# Patient Record
Sex: MALE | Race: OTHER | Employment: OTHER | URBAN - METROPOLITAN AREA
[De-identification: names, ages, dates, MRNs, and addresses within clinical notes are randomized per-mention and may not be internally consistent; named-entity substitution may affect disease eponyms.]

---

## 2021-02-12 ENCOUNTER — APPOINTMENT (OUTPATIENT)
Dept: CT IMAGING | Facility: HOSPITAL | Age: 56
End: 2021-02-12
Attending: EMERGENCY MEDICINE
Payer: COMMERCIAL

## 2021-02-12 ENCOUNTER — APPOINTMENT (OUTPATIENT)
Dept: GENERAL RADIOLOGY | Facility: HOSPITAL | Age: 56
End: 2021-02-12
Attending: EMERGENCY MEDICINE
Payer: COMMERCIAL

## 2021-02-12 ENCOUNTER — HOSPITAL ENCOUNTER (EMERGENCY)
Facility: HOSPITAL | Age: 56
Discharge: HOME | End: 2021-02-12
Attending: EMERGENCY MEDICINE
Payer: COMMERCIAL

## 2021-02-12 VITALS
TEMPERATURE: 98 F | BODY MASS INDEX: 37.93 KG/M2 | OXYGEN SATURATION: 96 % | RESPIRATION RATE: 18 BRPM | HEIGHT: 72 IN | WEIGHT: 280 LBS | DIASTOLIC BLOOD PRESSURE: 90 MMHG | SYSTOLIC BLOOD PRESSURE: 113 MMHG | HEART RATE: 99 BPM

## 2021-02-12 DIAGNOSIS — S16.1XXA STRAIN OF NECK MUSCLE, INITIAL ENCOUNTER: ICD-10-CM

## 2021-02-12 DIAGNOSIS — S50.12XA CONTUSION OF LEFT FOREARM, INITIAL ENCOUNTER: ICD-10-CM

## 2021-02-12 DIAGNOSIS — S09.8XXA BLUNT HEAD TRAUMA, INITIAL ENCOUNTER: Primary | ICD-10-CM

## 2021-02-12 LAB
ANION GAP SERPL CALC-SCNC: 3 MMOL/L (ref 0–18)
BASOPHILS # BLD AUTO: 0.07 X10(3) UL (ref 0–0.2)
BASOPHILS NFR BLD AUTO: 0.7 %
BUN BLD-MCNC: 21 MG/DL (ref 7–18)
BUN/CREAT SERPL: 13.8 (ref 10–20)
CALCIUM BLD-MCNC: 10 MG/DL (ref 8.5–10.1)
CHLORIDE SERPL-SCNC: 102 MMOL/L (ref 98–112)
CO2 SERPL-SCNC: 33 MMOL/L (ref 21–32)
CREAT BLD-MCNC: 1.52 MG/DL
DEPRECATED RDW RBC AUTO: 44.1 FL (ref 35.1–46.3)
EOSINOPHIL # BLD AUTO: 0.12 X10(3) UL (ref 0–0.7)
EOSINOPHIL NFR BLD AUTO: 1.2 %
ERYTHROCYTE [DISTWIDTH] IN BLOOD BY AUTOMATED COUNT: 13.1 % (ref 11–15)
GLUCOSE BLD-MCNC: 80 MG/DL (ref 70–99)
HCT VFR BLD AUTO: 48.9 %
HGB BLD-MCNC: 16.9 G/DL
IMM GRANULOCYTES # BLD AUTO: 0.02 X10(3) UL (ref 0–1)
IMM GRANULOCYTES NFR BLD: 0.2 %
LYMPHOCYTES # BLD AUTO: 2.09 X10(3) UL (ref 1–4)
LYMPHOCYTES NFR BLD AUTO: 21.2 %
MCH RBC QN AUTO: 31.9 PG (ref 26–34)
MCHC RBC AUTO-ENTMCNC: 34.6 G/DL (ref 31–37)
MCV RBC AUTO: 92.4 FL
MONOCYTES # BLD AUTO: 0.88 X10(3) UL (ref 0.1–1)
MONOCYTES NFR BLD AUTO: 8.9 %
NEUTROPHILS # BLD AUTO: 6.69 X10 (3) UL (ref 1.5–7.7)
NEUTROPHILS # BLD AUTO: 6.69 X10(3) UL (ref 1.5–7.7)
NEUTROPHILS NFR BLD AUTO: 67.8 %
OSMOLALITY SERPL CALC.SUM OF ELEC: 288 MOSM/KG (ref 275–295)
PLATELET # BLD AUTO: 294 10(3)UL (ref 150–450)
POTASSIUM SERPL-SCNC: 3.7 MMOL/L (ref 3.5–5.1)
RBC # BLD AUTO: 5.29 X10(6)UL
SODIUM SERPL-SCNC: 138 MMOL/L (ref 136–145)
WBC # BLD AUTO: 9.9 X10(3) UL (ref 4–11)

## 2021-02-12 PROCEDURE — 70498 CT ANGIOGRAPHY NECK: CPT | Performed by: EMERGENCY MEDICINE

## 2021-02-12 PROCEDURE — 85025 COMPLETE CBC W/AUTO DIFF WBC: CPT | Performed by: EMERGENCY MEDICINE

## 2021-02-12 PROCEDURE — 73000 X-RAY EXAM OF COLLAR BONE: CPT | Performed by: EMERGENCY MEDICINE

## 2021-02-12 PROCEDURE — 99284 EMERGENCY DEPT VISIT MOD MDM: CPT

## 2021-02-12 PROCEDURE — 36415 COLL VENOUS BLD VENIPUNCTURE: CPT

## 2021-02-12 PROCEDURE — 73090 X-RAY EXAM OF FOREARM: CPT | Performed by: EMERGENCY MEDICINE

## 2021-02-12 PROCEDURE — 70450 CT HEAD/BRAIN W/O DYE: CPT | Performed by: EMERGENCY MEDICINE

## 2021-02-12 PROCEDURE — 80048 BASIC METABOLIC PNL TOTAL CA: CPT | Performed by: EMERGENCY MEDICINE

## 2021-02-12 RX ORDER — ORPHENADRINE CITRATE 100 MG/1
100 TABLET, EXTENDED RELEASE ORAL 2 TIMES DAILY PRN
Qty: 20 TABLET | Refills: 0 | Status: SHIPPED | OUTPATIENT
Start: 2021-02-12 | End: 2021-02-12

## 2021-02-12 RX ORDER — ACETAMINOPHEN 325 MG/1
650 TABLET ORAL EVERY 6 HOURS PRN
Qty: 30 TABLET | Refills: 0 | Status: SHIPPED | OUTPATIENT
Start: 2021-02-12 | End: 2021-02-12 | Stop reason: ALTCHOICE

## 2021-02-12 RX ORDER — ORPHENADRINE CITRATE 100 MG/1
100 TABLET, EXTENDED RELEASE ORAL 2 TIMES DAILY PRN
Qty: 20 TABLET | Refills: 0 | Status: SHIPPED | OUTPATIENT
Start: 2021-02-12 | End: 2021-02-19

## 2021-02-12 RX ORDER — LOSARTAN POTASSIUM 100 MG/1
TABLET ORAL DAILY
COMMUNITY
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
